# Patient Record
Sex: MALE | Race: WHITE | Employment: FULL TIME | URBAN - NONMETROPOLITAN AREA
[De-identification: names, ages, dates, MRNs, and addresses within clinical notes are randomized per-mention and may not be internally consistent; named-entity substitution may affect disease eponyms.]

---

## 2017-01-25 DIAGNOSIS — I10 ESSENTIAL HYPERTENSION WITH GOAL BLOOD PRESSURE LESS THAN 140/90: ICD-10-CM

## 2017-01-25 DIAGNOSIS — J45.20 MILD INTERMITTENT ASTHMA WITHOUT COMPLICATION: Primary | ICD-10-CM

## 2017-01-25 RX ORDER — PREDNISONE 20 MG/1
20 TABLET ORAL 2 TIMES DAILY
Qty: 14 TABLET | Refills: 0 | Status: SHIPPED | OUTPATIENT
Start: 2017-01-25 | End: 2018-01-17

## 2017-01-25 RX ORDER — LISINOPRIL 40 MG/1
40 TABLET ORAL DAILY
Qty: 90 TABLET | Refills: 1 | Status: SHIPPED | OUTPATIENT
Start: 2017-01-25 | End: 2017-10-16

## 2017-01-25 RX ORDER — BUDESONIDE AND FORMOTEROL FUMARATE DIHYDRATE 80; 4.5 UG/1; UG/1
2 AEROSOL RESPIRATORY (INHALATION) 2 TIMES DAILY
Qty: 1 INHALER | Refills: 5 | Status: SHIPPED | OUTPATIENT
Start: 2017-01-25

## 2017-01-25 NOTE — TELEPHONE ENCOUNTER
SYMBICORT       Last Written Prescription Date: 12/1/16  Last Fill Quantity: 1 inhaler, # refills: 1    Last Office Visit with [facility] or [facility] prescribing provider:  12/1/16   Future Office Visit:       Date of Last Asthma Action Plan Letter:   Asthma Action Plan Q1 Year    Topic Date Due     Asthma Action Plan - yearly  12/01/2017      Asthma Control Test:   ACT Total Scores 12/1/2016   ACT TOTAL SCORE -   ASTHMA ER VISITS -   ASTHMA HOSPITALIZATIONS -   ACT TOTAL SCORE (Goal Greater than or Equal to 20) 19   In the past 12 months, how many times did you visit the emergency room for your asthma without being admitted to the hospital? 0   In the past 12 months, how many times were you hospitalized overnight because of your asthma? 0       Date of Last Spirometry Test:   No results found for this or any previous visit.    LISINOPRIL      Last Written Prescription Date: 12/1/16  Last Fill Quantity: 90, # refills: 0  Last Office Visit with [facility] or  [facility] prescribing provider: 12/1/16       POTASSIUM   Date Value Ref Range Status   04/05/2016 4.1 3.4 - 5.3 mmol/L Final     CREATININE   Date Value Ref Range Status   04/05/2016 1.10 0.66 - 1.25 mg/dL Final     BP Readings from Last 3 Encounters:   12/01/16 118/74   04/05/16 120/78   08/04/15 123/76     PREDNISONE      Last Written Prescription Date: 12/1/16  Last Fill Quantity: 14, # refills: 0  Last Office Visit with [facility], [facility] or [facility] prescribing provider: 12/1/16       POTASSIUM   Date Value Ref Range Status   04/05/2016 4.1 3.4 - 5.3 mmol/L Final     CREATININE   Date Value Ref Range Status   04/05/2016 1.10 0.66 - 1.25 mg/dL Final     BP Readings from Last 3 Encounters:   12/01/16 118/74   04/05/16 120/78   08/04/15 123/76     LISINOPRIL      Last Written Prescription Date: 12/1/16  Last Fill Quantity: 90, # refills: 0  Last Office Visit with [facility], [facility] or [facility] prescribing provider: 12/1/16       POTASSIUM   Date Value Ref Range Status   04/05/2016 4.1  3.4 - 5.3 mmol/L Final     CREATININE   Date Value Ref Range Status   04/05/2016 1.10 0.66 - 1.25 mg/dL Final     BP Readings from Last 3 Encounters:   12/01/16 118/74   04/05/16 120/78   08/04/15 123/76

## 2017-10-16 ENCOUNTER — TELEPHONE (OUTPATIENT)
Dept: GENERAL RADIOLOGY | Facility: CLINIC | Age: 67
End: 2017-10-16

## 2017-10-16 DIAGNOSIS — E78.5 HYPERLIPIDEMIA LDL GOAL <130: ICD-10-CM

## 2017-10-16 DIAGNOSIS — I10 ESSENTIAL HYPERTENSION WITH GOAL BLOOD PRESSURE LESS THAN 140/90: ICD-10-CM

## 2017-10-16 RX ORDER — LISINOPRIL 40 MG/1
40 TABLET ORAL DAILY
Qty: 90 TABLET | Refills: 1 | Status: SHIPPED | OUTPATIENT
Start: 2017-10-16 | End: 2018-04-10

## 2017-10-16 NOTE — TELEPHONE ENCOUNTER
Metoprolol      Last Written Prescription Date: 12/1/16  Last Fill Quantity: 45, # refills: 1    Last Office Visit with [facility] or [facility] prescribing provider:  12/1/16   Future Office Visit:        BP Readings from Last 3 Encounters:   12/01/16 118/74   04/05/16 120/78   08/04/15 123/76

## 2017-10-16 NOTE — TELEPHONE ENCOUNTER
lisinopril (PRINIVIL/ZESTRIL) 40 MG tablet      Last Written Prescription Date:  1/25/17  Last Fill Quantity: 90,   # refills: 1  Last Office Visit with [facility], [facility] or [facility] prescribing provider: 12/1/17  Future Office visit:       Routing refill request to provider for review/approval because:  Drug not on the [facility], [facility] or [facility] refill protocol or controlled substance

## 2017-10-17 RX ORDER — METOPROLOL SUCCINATE 25 MG/1
12.5 TABLET, EXTENDED RELEASE ORAL DAILY
Qty: 45 TABLET | Refills: 1 | Status: SHIPPED | OUTPATIENT
Start: 2017-10-17 | End: 2018-04-10

## 2018-01-17 DIAGNOSIS — J45.20 MILD INTERMITTENT ASTHMA WITHOUT COMPLICATION: ICD-10-CM

## 2018-01-17 DIAGNOSIS — E78.5 HYPERLIPIDEMIA LDL GOAL <130: ICD-10-CM

## 2018-01-17 NOTE — TELEPHONE ENCOUNTER
predniSONE (DELTASONE) 20 MG tablet      Last Written Prescription Date:  1/25/17  Last Fill Quantity: 14,   # refills: 0  Last Office Visit: 12/1/16  Future Office visit:       Routing refill request to provider for review/approval because:  Drug not on the [facility], [facility] or [facility] refill protocol or controlled substance

## 2018-01-18 RX ORDER — PREDNISONE 20 MG/1
20 TABLET ORAL 2 TIMES DAILY
Qty: 14 TABLET | Refills: 0 | Status: SHIPPED | OUTPATIENT
Start: 2018-01-18

## 2018-01-18 RX ORDER — ATORVASTATIN CALCIUM 80 MG/1
40 TABLET, FILM COATED ORAL DAILY
Qty: 45 TABLET | Refills: 0 | Status: SHIPPED | OUTPATIENT
Start: 2018-01-18 | End: 2018-04-10

## 2018-04-10 DIAGNOSIS — E78.5 HYPERLIPIDEMIA LDL GOAL <130: ICD-10-CM

## 2018-04-10 DIAGNOSIS — I10 ESSENTIAL HYPERTENSION WITH GOAL BLOOD PRESSURE LESS THAN 140/90: ICD-10-CM

## 2018-04-10 RX ORDER — ATORVASTATIN CALCIUM 80 MG/1
TABLET, FILM COATED ORAL
Qty: 45 TABLET | Refills: 0 | Status: SHIPPED | OUTPATIENT
Start: 2018-04-10

## 2018-04-10 RX ORDER — LISINOPRIL 40 MG/1
TABLET ORAL
Qty: 90 TABLET | Refills: 0 | Status: SHIPPED | OUTPATIENT
Start: 2018-04-10

## 2018-04-10 RX ORDER — METOPROLOL SUCCINATE 25 MG/1
12.5 TABLET, EXTENDED RELEASE ORAL DAILY
Qty: 45 TABLET | Refills: 0 | Status: SHIPPED | OUTPATIENT
Start: 2018-04-10

## 2018-04-10 NOTE — TELEPHONE ENCOUNTER
"Requested Prescriptions   Pending Prescriptions Disp Refills     metoprolol succinate (TOPROL-XL) 25 MG 24 hr tablet 45 tablet 1     Sig: Take 0.5 tablets (12.5 mg) by mouth daily    Beta-Blockers Protocol Failed    4/10/2018  2:38 PM       Failed - Blood pressure under 140/90 in past 12 months    BP Readings from Last 3 Encounters:   12/01/16 118/74   04/05/16 120/78   08/04/15 123/76                Failed - Recent (12 mo) or future (30 days) visit within the authorizing provider's specialty    Patient had office visit in the last 12 months or has a visit in the next 30 days with authorizing provider or within the authorizing provider's specialty.  See \"Patient Info\" tab in inbasket, or \"Choose Columns\" in Meds & Orders section of the refill encounter.           Passed - Patient is age 6 or older        Last Written Prescription Date:  10/17/17  Last Fill Quantity: 45,  # refills: 1   Last office visit: 12/1/2016 with prescribing provider:  DR Ayala   Future Office Visit:      "

## 2018-04-10 NOTE — TELEPHONE ENCOUNTER
"Requested Prescriptions   Pending Prescriptions Disp Refills     atorvastatin (LIPITOR) 80 MG tablet [Pharmacy Med Name: ATORVASTATIN* 80MG TAB] 45 tablet      Sig: TAKE 1/2 TABLET BY MOUTH EVERY DAY    Statins Protocol Failed    4/10/2018 11:32 AM       Failed - LDL on file in past 12 months    Recent Labs   Lab Test  04/05/16   0826   LDL  86            Failed - Recent (12 mo) or future (30 days) visit within the authorizing provider's specialty    Patient had office visit in the last 12 months or has a visit in the next 30 days with authorizing provider or within the authorizing provider's specialty.  See \"Patient Info\" tab in inbasket, or \"Choose Columns\" in Meds & Orders section of the refill encounter.     Last Written Prescription Date:  1/18/18  Last Fill Quantity: 45,  # refills: 0   Last office visit: 12/1/2016 with prescribing provider:     Future Office Visit:              Passed - No abnormal creatine kinase in past 12 months    No lab results found.            Passed - Patient is age 18 or older        lisinopril (PRINIVIL/ZESTRIL) 40 MG tablet [Pharmacy Med Name: LISINOPRIL* 40MG TAB] 90 tablet 0     Sig: TAKE ONE TABLET BY MOUTH ONE TIME DAILY    ACE Inhibitors (Including Combos) Protocol Failed    4/10/2018 11:32 AM       Failed - Blood pressure under 140/90 in past 12 months    BP Readings from Last 3 Encounters:   12/01/16 118/74   04/05/16 120/78   08/04/15 123/76                Failed - Recent (12 mo) or future (30 days) visit within the authorizing provider's specialty    Patient had office visit in the last 12 months or has a visit in the next 30 days with authorizing provider or within the authorizing provider's specialty.  See \"Patient Info\" tab in inbasket, or \"Choose Columns\" in Meds & Orders section of the refill encounter.      Last Written Prescription Date:  10/6/17  Last Fill Quantity: 90,  # refills: 1   Last office visit: 12/1/2016 with prescribing provider:     Future Office Visit: "             Failed - Normal serum creatinine on file in past 12 months    Recent Labs   Lab Test  04/05/16   0826   CR  1.10            Failed - Normal serum potassium on file in past 12 months    Recent Labs   Lab Test  04/05/16   0826   POTASSIUM  4.1            Passed - Patient is age 18 or older

## 2018-11-29 NOTE — TELEPHONE ENCOUNTER
"Requested Prescriptions   Pending Prescriptions Disp Refills     atorvastatin (LIPITOR) 80 MG tablet 45 tablet 3     Sig: Take 0.5 tablets (40 mg) by mouth daily    Statins Protocol Failed    1/17/2018 10:35 AM       Failed - LDL on file in past 12 months    Recent Labs   Lab Test  04/05/16   0826   LDL  86            Failed - Recent or future visit with authorizing provider    Patient had office visit in the last year or has a visit in the next 30 days with authorizing provider.  See \"Patient Info\" tab in inbasket, or \"Choose Columns\" in Meds & Orders section of the refill encounter.     Last Written Prescription Date:  12/1/16  Last Fill Quantity: 90,  # refills: 1   Last Office Visit with [facility] or [facility] prescribing provider:  12/1/16   Future Office Visit:                  Passed - No abnormal creatine kinase in past 12 months    No lab results found.         Passed - Patient is age 18 or older          " Attending